# Patient Record
Sex: FEMALE | Race: BLACK OR AFRICAN AMERICAN | NOT HISPANIC OR LATINO | Employment: OTHER | URBAN - METROPOLITAN AREA
[De-identification: names, ages, dates, MRNs, and addresses within clinical notes are randomized per-mention and may not be internally consistent; named-entity substitution may affect disease eponyms.]

---

## 2023-08-25 LAB
CHLAMYDIA TRACH., AMPLIFIED: NEGATIVE
N. GONORRHEA, AMPLIFIED: NEGATIVE
TRICHOMONAS VAGINALIS: NEGATIVE

## 2023-09-01 ENCOUNTER — LAB (OUTPATIENT)
Dept: LAB | Facility: LAB | Age: 28
End: 2023-09-01
Payer: MEDICAID

## 2023-09-01 LAB
ERYTHROCYTE DISTRIBUTION WIDTH (RATIO) BY AUTOMATED COUNT: 11.6 % (ref 11.5–14.5)
ERYTHROCYTE MEAN CORPUSCULAR HEMOGLOBIN CONCENTRATION (G/DL) BY AUTOMATED: 31.8 G/DL (ref 32–36)
ERYTHROCYTE MEAN CORPUSCULAR VOLUME (FL) BY AUTOMATED COUNT: 89 FL (ref 80–100)
ERYTHROCYTES (10*6/UL) IN BLOOD BY AUTOMATED COUNT: 3.96 X10E12/L (ref 4–5.2)
HEMATOCRIT (%) IN BLOOD BY AUTOMATED COUNT: 35.2 % (ref 36–46)
HEMOGLOBIN (G/DL) IN BLOOD: 11.2 G/DL (ref 12–16)
HEPATITIS B VIRUS SURFACE AG PRESENCE IN SERUM: NONREACTIVE
HEPATITIS C VIRUS AB PRESENCE IN SERUM: NONREACTIVE
HIV 1/ 2 AG/AB SCREEN: NONREACTIVE
LEUKOCYTES (10*3/UL) IN BLOOD BY AUTOMATED COUNT: 2.9 X10E9/L (ref 4.4–11.3)
NRBC (PER 100 WBCS) BY AUTOMATED COUNT: 0 /100 WBC (ref 0–0)
PLATELETS (10*3/UL) IN BLOOD AUTOMATED COUNT: 276 X10E9/L (ref 150–450)
SYPHILIS TOTAL AB: NONREACTIVE

## 2024-02-29 ENCOUNTER — OFFICE VISIT (OUTPATIENT)
Dept: PRIMARY CARE | Facility: CLINIC | Age: 29
End: 2024-02-29
Payer: COMMERCIAL

## 2024-02-29 VITALS
BODY MASS INDEX: 27.29 KG/M2 | HEIGHT: 65 IN | TEMPERATURE: 97.7 F | HEART RATE: 102 BPM | WEIGHT: 163.8 LBS | SYSTOLIC BLOOD PRESSURE: 112 MMHG | DIASTOLIC BLOOD PRESSURE: 80 MMHG

## 2024-02-29 DIAGNOSIS — J06.9 UPPER RESPIRATORY TRACT INFECTION, UNSPECIFIED TYPE: Primary | ICD-10-CM

## 2024-02-29 DIAGNOSIS — J30.9 ALLERGIC RHINITIS, UNSPECIFIED SEASONALITY, UNSPECIFIED TRIGGER: ICD-10-CM

## 2024-02-29 PROCEDURE — 1036F TOBACCO NON-USER: CPT

## 2024-02-29 PROCEDURE — 99213 OFFICE O/P EST LOW 20 MIN: CPT

## 2024-02-29 RX ORDER — FLUTICASONE PROPIONATE 50 MCG
1 SPRAY, SUSPENSION (ML) NASAL DAILY
Qty: 16 G | Refills: 5 | Status: SHIPPED | OUTPATIENT
Start: 2024-02-29 | End: 2025-02-28

## 2024-02-29 RX ORDER — ALBUTEROL SULFATE 90 UG/1
AEROSOL, METERED RESPIRATORY (INHALATION)
COMMUNITY
Start: 2015-06-01

## 2024-02-29 RX ORDER — LORATADINE 10 MG/1
10 TABLET ORAL DAILY
Qty: 30 TABLET | Refills: 11 | Status: SHIPPED | OUTPATIENT
Start: 2024-02-29 | End: 2025-02-28

## 2024-02-29 ASSESSMENT — PAIN SCALES - GENERAL: PAINLEVEL: 4

## 2024-02-29 NOTE — PROGRESS NOTES
Subjective   Patient ID: Moriah Lincoln is a 28 y.o. female who presents for Establish Care.    HPI  Asthma, depression, anxiety, ADD, iron deficiency anemia from menstrual losses.     PCP no longer practicing at     #URI  - Wednesday- cough congestion wax wanes  - productive sputum   - reports sinus TTP, post nasal drip, sore throat 2/2 coughing  - reports chronic sinusitis  - reports having food posoining 1.5 weeks ago  - admits to body aches which are typical when on her cycle  - COVID negative x2  - using inhaler last on Sunday   - denies fever, chills    - eating, drinking okay  - saline nasal lavage helps  - allegra typically helps  - unsure if mucinex is helping with symptoms    #mild intermittent asthma  - uses albuterol once a week    #MDD  #Anxiety  - Counseling with Dr. Cosmo Oh      Review of Systems    Previous history  Past Medical History:   Diagnosis Date    ADHD (attention deficit hyperactivity disorder)     Anemia     Anxiety     Asthma     Candidiasis, unspecified 07/30/2018    Yeast infection    Depression     Personal history of other diseases of the female genital tract 07/24/2018    History of vaginal discharge    Personal history of other diseases of the respiratory system     Personal history of asthma    Personal history of other infectious and parasitic diseases     History of respiratory syncytial virus infection     Past Surgical History:   Procedure Laterality Date    OTHER SURGICAL HISTORY  06/18/2020    [place] tooth extraction     Social History     Tobacco Use    Smoking status: Never     Passive exposure: Never    Smokeless tobacco: Never   Substance Use Topics    Alcohol use: Not Currently    Drug use: Yes     Types: Other     Comment: THC topical and edibles     Family History   Problem Relation Name Age of Onset    Hypertension Mother      Hypertension Father      Emphysema Father      Depression Father      Bipolar disorder Father       No Known Allergies  Current  Outpatient Medications   Medication Instructions    albuterol 90 mcg/actuation inhaler inhalation    fluticasone (Flonase) 50 mcg/actuation nasal spray 1 spray, Each Nostril, Daily, Shake gently. Before first use, prime pump. After use, clean tip and replace cap.    loratadine (CLARITIN) 10 mg, oral, Daily       Objective       Physical Exam  HENT:      Head: Normocephalic and atraumatic.      Left Ear: Tympanic membrane normal. There is no impacted cerumen.      Ears:      Comments: Right ear with mild erythema     Nose:      Comments: Swollen R nasal turbinate  Eyes:      General: No scleral icterus.     Conjunctiva/sclera: Conjunctivae normal.   Cardiovascular:      Rate and Rhythm: Normal rate and regular rhythm.      Heart sounds: No murmur heard.     No friction rub. No gallop.   Pulmonary:      Effort: Pulmonary effort is normal. No respiratory distress.      Breath sounds: Normal breath sounds.   Abdominal:      General: There is no distension.      Palpations: Abdomen is soft.      Tenderness: There is no abdominal tenderness.   Musculoskeletal:         General: Normal range of motion.   Skin:     General: Skin is warm and dry.   Neurological:      General: No focal deficit present.      Mental Status: She is alert and oriented to person, place, and time.   Psychiatric:         Mood and Affect: Mood normal.         Assessment/Plan   Moriah Lincoln is a 28 y.o. female who presents for the concerns below:    Problem List Items Addressed This Visit    None  Visit Diagnoses       Upper respiratory tract infection, unspecified type    -  Primary    Relevant Medications    fluticasone (Flonase) 50 mcg/actuation nasal spray    loratadine (Claritin) 10 mg tablet    Other Relevant Orders    Referral to ENT    Allergic rhinitis, unspecified seasonality, unspecified trigger        Relevant Medications    fluticasone (Flonase) 50 mcg/actuation nasal spray    loratadine (Claritin) 10 mg tablet    Other Relevant Orders     Referral to ENT          #URI  #Allergic rhinitis  - Flonase and Claritin   - c/w hydration  - monitor for worsening of symptoms despite medication regimen  - requesting ENT referral for chronic issues    Discussed with:  Dr. Sapp    Portions of this note were generated using digital voice recognition software, and may contain grammatical errors       Jaylen Herrera, DO  PGY-2, Family Medicine

## 2024-03-04 NOTE — PROGRESS NOTES
I reviewed the resident/fellow's documentation and discussed the patient with the resident/fellow. I agree with the resident/fellow's medical decision making as documented in the note.    Joshua Sapp MD

## 2024-03-12 ENCOUNTER — OFFICE VISIT (OUTPATIENT)
Dept: OTOLARYNGOLOGY | Facility: CLINIC | Age: 29
End: 2024-03-12
Payer: COMMERCIAL

## 2024-03-12 VITALS — HEIGHT: 65 IN | WEIGHT: 162 LBS | BODY MASS INDEX: 26.99 KG/M2

## 2024-03-12 DIAGNOSIS — J06.9 UPPER RESPIRATORY TRACT INFECTION, UNSPECIFIED TYPE: ICD-10-CM

## 2024-03-12 DIAGNOSIS — J34.89 NASAL AND SINUS DISCHARGE: Primary | ICD-10-CM

## 2024-03-12 DIAGNOSIS — J30.9 ALLERGIC RHINITIS, UNSPECIFIED SEASONALITY, UNSPECIFIED TRIGGER: ICD-10-CM

## 2024-03-12 PROCEDURE — 1036F TOBACCO NON-USER: CPT | Performed by: OTOLARYNGOLOGY

## 2024-03-12 PROCEDURE — 99203 OFFICE O/P NEW LOW 30 MIN: CPT | Performed by: OTOLARYNGOLOGY

## 2024-03-12 PROCEDURE — 31231 NASAL ENDOSCOPY DX: CPT | Performed by: OTOLARYNGOLOGY

## 2024-03-12 RX ORDER — AZELASTINE 1 MG/ML
2 SPRAY, METERED NASAL 2 TIMES DAILY
Qty: 30 ML | Refills: 3 | Status: SHIPPED | OUTPATIENT
Start: 2024-03-12 | End: 2024-04-11

## 2024-03-12 ASSESSMENT — PAIN SCALES - GENERAL: PAINLEVEL: 0-NO PAIN

## 2024-03-12 ASSESSMENT — PATIENT HEALTH QUESTIONNAIRE - PHQ9
2. FEELING DOWN, DEPRESSED OR HOPELESS: NOT AT ALL
SUM OF ALL RESPONSES TO PHQ9 QUESTIONS 1 & 2: 0
1. LITTLE INTEREST OR PLEASURE IN DOING THINGS: NOT AT ALL

## 2024-03-12 NOTE — PROGRESS NOTES
"Chief Complaint:  Recurrent sinusitis    Referring Provider: Joshua Sapp MD    History of Present Illness:    Moriah Lincoln is a 28 y.o. female, presenting for evaluation of for recurrent acute sinusitis.  She states that she has had 4 sinus infections per year for quite some time.  She has had previous allergy testing which demonstrated an allergy to dogs but no other aeroallergens.  She has not trialed topical steroids or antihistamines for an extended period of time.  She has never undergone imaging.  Her sense of smell is somewhat depressed.  When she gets infected she takes antibiotics which seems to improve her symptoms.  She states that the episodes start with stuffy nose and postnasal drainage and then eventually resulted in facial pain and pressure and purulent rhinorrhea.  No previous sinus surgery.    ?  Review of Systems:    Review of symptoms was negative except for those stated including Cardiopulmonary, Genitourinary, Gastrointestinal, Psychological, Sleep pattern, Endocrine, Eyes, Neurologic, Musculoskeletal, Skin, Hematologic/Lymphatic and Allergic/Immunologic.     Medical History:    I have reviewed the patient's updated past medical history, surgical history, family history, social history, as well as current medications and allergies as of 3/12/2024. Changes to these items have been updated and marked as reviewed in the electronic medical record.    Physical Exam:    Vitals:  height is 1.651 m (5' 5\") and weight is 73.5 kg (162 lb).   General: Patient doing well overall and is in no apparent distress.  Psych: Pleasant affect, and answers questions appropriately.  Head & Face: Symmetric facial movements  Eyes: Pupils equal, round, reactive.  Extraocular movements intact without gaze restrictions or nystagmus. No epiphora.  Ears:  External auditory canals are normal.  Tympanic membranes are clear.  No middle ear effusion is seen.  All middle ear landmarks are normal.  Nose: Anterior " rhinoscopy revealed normal sinonasal mucosa. More posterior areas of the nasal cavity could not be completely examined.  Oral Cavity/Oropharynx:  Without lesions or masses to visual exam.  Neck: Supple without lymphadenopathy.  Lungs: Non-labored, and without evidence of stridor.  Cardiac: Pulses are strong, well-perfused.  Extremities: Without gross evidence of clubbing, cyanosis, or edema.  Neuro: Cranial nerves II-XII grossly intact; Intact facial movements.    Procedure:  Rigid nasal endoscopy (98411)  Pre-procedure diagnosis/Indication for procedure:  To evaluate areas not visualized on anterior rhinoscopy   Anesthesia:  1% phenylephrine and 4% lidocaine topical spray   Description:  A 0-degree 4-mm rigid nasal endoscope was used to examine the left and right nasal cavities.  The nasal valve areas were examined for abnormalities or collapse.  The inferior and middle turbinates were evaluated.  The middle and superior meatuses, and the sphenoethmoid recesses were examined and inspected for mucopurulence and polyps. Once the endoscope was withdrawn, the patient was noted to have tolerated the procedure well without complications and was returned to ambulatory status.    Findings:    Moderate to severe inferior turbinate hypertrophy bilaterally.  The septum is midline.  The middle meatus and ethmoid cavity appeared clear.  The sphenoethmoidal recess is clear.  She tolerated the procedure well.    Assessment:     Moriah Lincoln is a 28 y.o. female with recurrent acute sinusitis.    Plan:      Moriah appears to have chronic rhinitis likely allergy mediated.  I recommend that she use a topical antihistamine and topical nasal steroid twice daily and follow up with me in 6 months or sooner if needed.     Rob Azar MD, M.Eng.   of Otolaryngology - Head & Neck Surgery  Division of Rhinology and Endoscopic Skull Base Surgery  [facility]  [facility]

## 2024-04-01 ENCOUNTER — APPOINTMENT (OUTPATIENT)
Dept: PRIMARY CARE | Facility: CLINIC | Age: 29
End: 2024-04-01
Payer: COMMERCIAL

## 2024-09-18 ENCOUNTER — APPOINTMENT (OUTPATIENT)
Dept: OTOLARYNGOLOGY | Facility: CLINIC | Age: 29
End: 2024-09-18
Payer: COMMERCIAL

## 2024-12-17 ENCOUNTER — OFFICE VISIT (OUTPATIENT)
Dept: DERMATOLOGY | Facility: CLINIC | Age: 29
End: 2024-12-17
Payer: COMMERCIAL

## 2024-12-17 DIAGNOSIS — L44.1 LICHEN NITIDUS: Primary | ICD-10-CM

## 2024-12-17 PROCEDURE — 99203 OFFICE O/P NEW LOW 30 MIN: CPT | Performed by: DERMATOLOGY

## 2024-12-17 PROCEDURE — 1036F TOBACCO NON-USER: CPT | Performed by: DERMATOLOGY

## 2024-12-17 RX ORDER — TRIAMCINOLONE ACETONIDE 1 MG/G
CREAM TOPICAL
Qty: 80 G | Refills: 1 | Status: SHIPPED | OUTPATIENT
Start: 2024-12-17

## 2024-12-17 ASSESSMENT — DERMATOLOGY PATIENT ASSESSMENT
DO YOU USE A TANNING BED: NO
ARE YOU AN ORGAN TRANSPLANT RECIPIENT: NO
DO YOU HAVE ANY NEW OR CHANGING LESIONS: NO

## 2024-12-17 ASSESSMENT — DERMATOLOGY QUALITY OF LIFE (QOL) ASSESSMENT
RATE HOW EMOTIONALLY BOTHERED YOU ARE BY YOUR SKIN PROBLEM (FOR EXAMPLE, WORRY, EMBARRASSMENT, FRUSTRATION): 4
RATE HOW BOTHERED YOU ARE BY EFFECTS OF YOUR SKIN PROBLEMS ON YOUR ACTIVITIES (EG, GOING OUT, ACCOMPLISHING WHAT YOU WANT, WORK ACTIVITIES OR YOUR RELATIONSHIPS WITH OTHERS): 3
RATE HOW BOTHERED YOU ARE BY SYMPTOMS OF YOUR SKIN PROBLEM (EG, ITCHING, STINGING BURNING, HURTING OR SKIN IRRITATION): 4
ARE THERE EXCLUSIONS OR EXCEPTIONS FOR THE QUALITY OF LIFE ASSESSMENT: NO

## 2024-12-17 ASSESSMENT — ITCH NUMERIC RATING SCALE: HOW SEVERE IS YOUR ITCHING?: 6

## 2024-12-17 ASSESSMENT — PATIENT GLOBAL ASSESSMENT (PGA): PATIENT GLOBAL ASSESSMENT: PATIENT GLOBAL ASSESSMENT:  2 - MILD

## 2024-12-17 NOTE — PROGRESS NOTES
Subjective     Moriah Lincoln is a 29 y.o. female who presents for the following: Rash (Pt here for Rash, currently located on arms, shoulders, and back. Rash has been coming and going since she was a body. Rash is itchy, tiny bumps, can feel rash more that see. Worse in warmer weather. No current treatment. Previous Dermatologist(Dr. Carrillo)treated with Blue light therapy. Pt also has spots on breast and one area on scalp-unsure if related. Fhx of Psoriasis-Mother, Fhx of Eczema-Sister. ).     Review of Systems:  No other skin or systemic complaints other than what is documented elsewhere in the note.    The following portions of the chart were reviewed this encounter and updated as appropriate:          Skin Cancer History  No skin cancer on file.      Specialty Problems    None       Objective   Well appearing patient in no apparent distress; mood and affect are within normal limits.    A focused skin examination was performed of the face, upper extremities, chest. All findings within normal limits unless otherwise noted below.    Assessment/Plan   1. Lichen nitidus  Right Shoulder - Anterior  1-2mm flat topped skin colored papules, localized currently to the right shoulder    The various causes of this skin eruption were reviewed including lichen nitidus.    The need for punch biopsy for diagnostic and therapeutic considerations were reviewed. Risks and benefits reviewed, including, but not limited to scar at biopsy site, risks of bleeding, and infection. There will be sutures placed and will require suture removal.      Discussed with biopsy that results may not be 100% diagnostic and at times may require repeat biopsy.     Patient preferred to defer at this time, will schedule in the future if she desires    Due to symptoms offered topical cream for temporary relief. Advised we do not offer blue light therapy here and phototherapy may worsen as she notes that sunlight has worsened for her  previously.    Start triamcinolone 0.1% cream. Patient to apply 2x daily x 2 wks then decrease to 2x/day 2 days per week. Can repeat full 2 week course as often as every 6-8 weeks as needed for flaring. Side effects of topical steroids includes, but is not limited to skin atrophy and dyspigmentation.      triamcinolone (Kenalog) 0.1 % cream - Right Shoulder - Anterior  Apply to the upper extremities 2x daily x 2 weeks then 2x daily 2 days per week if needed.    Follow up as needed.